# Patient Record
Sex: FEMALE | URBAN - METROPOLITAN AREA
[De-identification: names, ages, dates, MRNs, and addresses within clinical notes are randomized per-mention and may not be internally consistent; named-entity substitution may affect disease eponyms.]

---

## 2024-01-22 ENCOUNTER — HOSPITAL ENCOUNTER (OUTPATIENT)
Dept: CT IMAGING | Age: 39
Discharge: HOME | End: 2024-01-24
Payer: COMMERCIAL

## 2024-01-22 DIAGNOSIS — S93.401S SPRAIN OF RIGHT ANKLE, UNSPECIFIED LIGAMENT, SEQUELA: ICD-10-CM

## 2024-01-22 DIAGNOSIS — S92.101S CLOSED NONDISPLACED FRACTURE OF RIGHT TALUS, UNSPECIFIED PORTION OF TALUS, SEQUELA: ICD-10-CM

## 2024-01-22 PROCEDURE — 73700 CT LOWER EXTREMITY W/O DYE: CPT

## 2024-01-24 ENCOUNTER — OFFICE VISIT (OUTPATIENT)
Age: 39
End: 2024-01-24

## 2024-01-24 VITALS — WEIGHT: 220 LBS | BODY MASS INDEX: 35.36 KG/M2 | HEIGHT: 66 IN

## 2024-01-24 DIAGNOSIS — S92.901A CLOSED FRACTURE OF RIGHT FOOT, INITIAL ENCOUNTER: Primary | ICD-10-CM

## 2024-01-24 NOTE — PROGRESS NOTES
[facility] Orthopedics & Sports Medicine      [facility] PHYSICIANS [facility]  [facility]IC[facility]  [address]  [place] [address]  Dept: [phone]  Dept Fax: [phone]    Chief Compliant:  Chief Complaint   Patient presents with    Fracture     R foot        History of Present Illness:  This is a 38 y.o. female who presents to the clinic today for evaluation / follow up of right foot fracture.  She did this falling out of a camper.  She is here today for further valuation she has been on crutches..       Physical Exam:    On examination today she has swelling really from the ankle down into the toes with some ecchymosis present in the foot.  As I low the Lisfranc joint this does not bother her at all.  She has no instability here.  She has no tenderness anywhere about the ankle she has tenderness over the anterior process of the calcaneus and over the navicular dorsally.  She has intact plantarflexion dorsiflexion skin is intact    Nursing note and vitals reviewed.     Labs and Imaging:   CT scan of the right foot shows an avulsion fracture of the dorsal aspect of the navicular and also at the anterior process of the calcaneus Lisfranc joint looks to be stable no other injuries  XR taken today:  No results found.      No orders of the defined types were placed in this encounter.      Assessment and Plan:  No diagnosis found.      This is a 38 y.o. female with avulsion fracture of the navicular and anterior process of the calcaneus.  At this point I think both these can be treated conservatively.  She can be weightbearing as tolerated in a cam boot.  After several weeks she can start to weight-bear in a shoe if she feels comfortable enough.  I will see her back in about 5 weeks to start some therapy at that point and certainly transition her into a shoe.  If she has not done so already.         Review of Systems   Constitutional: Negative for

## 2024-02-28 ENCOUNTER — OFFICE VISIT (OUTPATIENT)
Age: 39
End: 2024-02-28

## 2024-02-28 VITALS — HEIGHT: 66 IN | BODY MASS INDEX: 35.36 KG/M2 | WEIGHT: 220 LBS

## 2024-02-28 DIAGNOSIS — Z09 S/P ORTHOPEDIC SURGERY, FOLLOW-UP EXAM: Primary | ICD-10-CM

## 2024-02-28 PROCEDURE — 99024 POSTOP FOLLOW-UP VISIT: CPT | Performed by: ORTHOPAEDIC SURGERY

## 2024-02-28 NOTE — PROGRESS NOTES
[facility] Orthopedics & Sports Medicine      [facility] PHYSICIANS [facility]  [facility] MEDICINE  [address]  [place] [address]  Dept: [phone]  Dept Fax: [phone]    Chief Compliant:  Chief Complaint   Patient presents with    Follow-up     R foot fx        History of Present Illness:  This is a 38 y.o. female who presents to the clinic today for evaluation / follow up of right foot navicular and anterior process calcaneus fracture.  She states at this point she is feeling much better she still does get some discomfort over the anterior ankle joint and a little over the anterior lateral ankle joint.  She really does not have much tenderness over the actual fracture sites over the anterior process of the calcaneus or the navicular..       Physical Exam:    On examination she has excellent range of motion no tenderness over the peroneal tendons there is no ankle effusion I really cannot reproduce the discomfort today she states is more just when she is walking.  Skin is intact.  No instability    Nursing note and vitals reviewed.     Labs and Imaging:     XR taken today:  No results found.      No orders of the defined types were placed in this encounter.      Assessment and Plan:  No diagnosis found.      This is a 38 y.o. female with right calcaneus fracture and navicular avulsion fracture.  I think that she is a recovering as I would expect.  I do think that the inflammation around the ankle should resolve over the next 6 weeks.  If she still having troubles with at that time we can see her back for reexamination.  I explained her the expected course recovery we will see her back as needed.         Review of Systems   Constitutional: Negative for fever, chills, sweats.   Neurological: Negative numbness, or weakness.   Integumentary: Negative for rash, itching, laceration, or abrasion.   Musculoskeletal: Positive for Follow-up (R foot